# Patient Record
Sex: FEMALE | Race: WHITE | NOT HISPANIC OR LATINO | Employment: OTHER | URBAN - METROPOLITAN AREA
[De-identification: names, ages, dates, MRNs, and addresses within clinical notes are randomized per-mention and may not be internally consistent; named-entity substitution may affect disease eponyms.]

---

## 2022-08-15 ENCOUNTER — PATIENT OUTREACH (OUTPATIENT)
Dept: ADMINISTRATIVE | Facility: CLINIC | Age: 77
End: 2022-08-15

## 2022-08-15 NOTE — TELEPHONE ENCOUNTER
Atorvastatin 10mg - once daily     Metoprolol 100mg - 0.5 tablet daily     escitalopram 10mg - 1 tablet daily     Amlodopine 5mg - 1 tablet daily

## 2022-08-15 NOTE — PROGRESS NOTES
C3 nurse spoke with Keena Barber for a [facility] post hospital discharge follow up call. The patient has a scheduled HOSFU appointment with her PCP on 9/22/22 @ 1000.      Patient states she is only taking:     Atorvastatin 10mg - once daily     Metoprolol 100mg - 0.5 tablet daily     escitalopram 10mg - 1 tablet daily     Amlodopine 5mg - 1 tablet daily